# Patient Record
Sex: MALE | Race: WHITE | NOT HISPANIC OR LATINO | URBAN - NONMETROPOLITAN AREA
[De-identification: names, ages, dates, MRNs, and addresses within clinical notes are randomized per-mention and may not be internally consistent; named-entity substitution may affect disease eponyms.]

---

## 2021-03-02 NOTE — PATIENT DISCUSSION
Diabetes without Retinopathy Counseling:  I have discussed with the patient the importance of controlling blood glucose to minimize the risk of developing retinal disease from diabetes. Explained the importance of annual dilated eye exams. Return for follow-up as scheduled. No

## 2021-08-10 ENCOUNTER — IMPORTED ENCOUNTER (OUTPATIENT)
Dept: URBAN - NONMETROPOLITAN AREA CLINIC 1 | Facility: CLINIC | Age: 71
End: 2021-08-10

## 2021-08-10 PROBLEM — H25.813: Noted: 2021-08-10

## 2021-08-10 PROBLEM — H52.4: Noted: 2021-08-10

## 2021-08-10 PROBLEM — E11.9: Noted: 2021-08-10

## 2021-08-10 PROCEDURE — S0620 ROUTINE OPHTHALMOLOGICAL EXA: HCPCS

## 2021-08-10 NOTE — PATIENT DISCUSSION
Presbyopia OUDiscussed refractive status in detail with patient. MR done today but do not recommend updating due to cataract progression. Continue to monitor. NIDDM sans Retinopathy Discussed diagnosis with patient. Discussed the risk of diabetic damage of the retina with potential vision loss and the importance of routine follow-up. Emphasized strict blood sugar control. Continue to monitor. Combined Cataract OUDiscussed diagnosis with patient. Reviewed symptoms related to cataract progression. Discussed various treatment options with patient. Recommend cataract evaluation by Dr. Arabella Blevins. Patient elects to schedule.

## 2021-08-23 ENCOUNTER — IMPORTED ENCOUNTER (OUTPATIENT)
Dept: URBAN - NONMETROPOLITAN AREA CLINIC 1 | Facility: CLINIC | Age: 71
End: 2021-08-23

## 2021-08-23 PROBLEM — H52.4: Noted: 2021-08-23

## 2021-08-23 PROBLEM — E11.9: Noted: 2021-08-23

## 2021-08-23 PROBLEM — H25.813: Noted: 2021-08-23

## 2021-08-23 PROCEDURE — 92014 COMPRE OPH EXAM EST PT 1/>: CPT

## 2021-08-23 NOTE — PATIENT DISCUSSION
Cataract(s)-Visually significant cataract OU .-Cataract(s) causing symptomatic impairment of visual function not correctable with a tolerable change in glasses or contact lenses lighting or non-operative means resulting in specific activity limitations and/or participation restrictions including but not limited to reading viewing television driving or meeting vocational or recreational needs. -Expectation is clearer vision and functional improvement in symptoms as well as reduced glare disability after cataract removal.-Order IOLMaster and OPD today. -Recommend Toric OD/LRI OS vs Stand/Trad OU  based on today's OPD testing and lifestyle questionnaire.-All questions were answered regarding surgery including pre and post-op medications appointments activity restrictions and anesthetic usage.-The risks benefits and alternatives and special risk factors for the patient were discussed in detail including but not limited to: bleeding infection retinal detachment vitreous loss problems with the implant and possible need for additional surgery.-Although rare the possibility of complete vision loss was discussed.-The possible need for glasses post-operatively was discussed.-Order medical clearance exam based on history of diabetes-Patient elects to proceed with cataract surgery OD . Will schedule at patient's convenience and re-evaluate OS  in the future. Discussed lensQualifies for Toric OD and LRI OS explained lens benefits and advised would still need glasses for reading. Discussed Stand/Trad OU and need for bifocals s/p surgery. Discussed LenSx vs TradPost op inflammation anticipated discussed dextenza insertion after surgery.

## 2021-08-27 ENCOUNTER — IMPORTED ENCOUNTER (OUTPATIENT)
Dept: URBAN - NONMETROPOLITAN AREA CLINIC 1 | Facility: CLINIC | Age: 71
End: 2021-08-27

## 2021-08-27 PROBLEM — E11.9: Noted: 2021-08-27

## 2021-08-27 PROBLEM — H52.4: Noted: 2021-08-27

## 2021-08-27 PROBLEM — H25.813: Noted: 2021-08-27

## 2021-09-16 PROBLEM — I10: Noted: 2021-09-16

## 2021-09-16 PROBLEM — Z01.818: Noted: 2021-09-16

## 2021-09-16 PROBLEM — E78.5: Noted: 2021-09-16

## 2021-09-16 PROBLEM — E11.9: Noted: 2021-08-23

## 2021-09-22 ENCOUNTER — IMPORTED ENCOUNTER (OUTPATIENT)
Dept: URBAN - NONMETROPOLITAN AREA CLINIC 1 | Facility: CLINIC | Age: 71
End: 2021-09-22

## 2021-10-08 ENCOUNTER — IMPORTED ENCOUNTER (OUTPATIENT)
Dept: URBAN - NONMETROPOLITAN AREA CLINIC 1 | Facility: CLINIC | Age: 71
End: 2021-10-08

## 2021-10-08 PROBLEM — Z98.41: Noted: 2021-10-08

## 2021-10-08 PROCEDURE — 0356T INSERTION OF DRUG-ELUTING IMPLANT (INCLUDING PUNCTAL DILATION AND IMPLANT REMOVAL WHEN PERFORMED) INTO LACRIMAL CANALICULUS, EACH: CPT

## 2021-10-08 PROCEDURE — V2787 ASTIGMATISM-CORRECT FUNCTION: HCPCS

## 2021-10-08 PROCEDURE — 99024 POSTOP FOLLOW-UP VISIT: CPT

## 2021-10-08 PROCEDURE — 92136 OPHTHALMIC BIOMETRY: CPT

## 2021-10-08 PROCEDURE — 66984 XCAPSL CTRC RMVL W/O ECP: CPT

## 2021-10-08 NOTE — PATIENT DISCUSSION
s/p PCIOL-Pt doing well s/p PCIOL. -Continue post-op gtts according to instruction sheet and sleep with eye shield over eye for 7 nights.-Avoid bending at the waist lifting anything over 5lbs and dirty or josi environments. -RTC .

## 2021-10-13 ENCOUNTER — IMPORTED ENCOUNTER (OUTPATIENT)
Dept: URBAN - NONMETROPOLITAN AREA CLINIC 1 | Facility: CLINIC | Age: 71
End: 2021-10-13

## 2021-10-13 PROBLEM — H25.812: Noted: 2021-10-13

## 2021-10-13 PROBLEM — Z98.41: Noted: 2021-10-13

## 2021-10-13 PROCEDURE — 99024 POSTOP FOLLOW-UP VISIT: CPT

## 2021-10-13 NOTE — PATIENT DISCUSSION
Cataract(s)-Visually significant cataract OS . -Cataract(s) causing symptomatic impairment of visual function not correctable with a tolerable change in glasses or contact lenses lighting or non-operative means resulting in specific activity limitations and/or participation restrictions including but not limited to reading viewing television driving or meeting vocational or recreational needs. -Expectation is clearer vision and functional improvement in symptoms as well as reduced glare disability after cataract removal.-Recommend Toric IOL   /   Limbal Relaxing Incisions based on previous OPD testing and lifestyle questionnaire.-All questions were answered regarding surgery including pre and post-op medications appointments activity restrictions and anesthetic usage.-The risks benefits and alternatives and special risk factors for the patient were discussed in detail including but not limited to: bleeding infection retinal detachment vitreous loss problems with the implant and possible need for additional surgery.-Although rare the possibility of complete vision loss was discussed.-The need for glasses post-operatively was discussed.-Patient elects to proceed with cataract surgery OS . Will schedule at patient's convenience. s/p PCIOL CE OD Toric/LenSx + Dextenza -Pt doing well at 1 week s/p PCIOL. -Continue post-op gtts according to instruction sheet.-Okay to resume usual activites and d/c eye shield.

## 2021-11-05 ENCOUNTER — IMPORTED ENCOUNTER (OUTPATIENT)
Dept: URBAN - NONMETROPOLITAN AREA CLINIC 1 | Facility: CLINIC | Age: 71
End: 2021-11-05

## 2021-11-05 PROBLEM — Z98.41: Noted: 2021-10-13

## 2021-11-05 PROBLEM — Z98.42: Noted: 2021-11-05

## 2021-11-05 PROCEDURE — 66999 UNLISTED PX ANT SEGMENT EYE: CPT

## 2021-11-05 PROCEDURE — 99024 POSTOP FOLLOW-UP VISIT: CPT

## 2021-11-05 PROCEDURE — 66984 XCAPSL CTRC RMVL W/O ECP: CPT

## 2021-11-05 PROCEDURE — 92136 OPHTHALMIC BIOMETRY: CPT

## 2021-11-05 PROCEDURE — 0356T INSERTION OF DRUG-ELUTING IMPLANT (INCLUDING PUNCTAL DILATION AND IMPLANT REMOVAL WHEN PERFORMED) INTO LACRIMAL CANALICULUS, EACH: CPT

## 2021-11-05 NOTE — PATIENT DISCUSSION
Same Day POV CE OS 11/5/21 LRI/LenSx CE OD 10/8/21 Toric/LenSx w Dexenza - Discussed diagnosis in detail with patient. - Patient doing well and stable. - Continue post op drops as directed. - Continue to monitor. RTC in 1 week for POV

## 2021-11-09 ENCOUNTER — IMPORTED ENCOUNTER (OUTPATIENT)
Dept: URBAN - NONMETROPOLITAN AREA CLINIC 1 | Facility: CLINIC | Age: 71
End: 2021-11-09

## 2021-11-09 PROCEDURE — 99024 POSTOP FOLLOW-UP VISIT: CPT

## 2022-04-09 ASSESSMENT — VISUAL ACUITY
OS_CC: 20/40
OS_AM: 20/25
OS_GLARE: 20/50
OS_AM: 20/25
OS_GLARE: 20/50
OS_AM: 20/25
OD_CC: 20/30-
OD_PAM: 20/30
OD_PH: 20/30
OS_CC: 20/25
OD_PH: 20/30-2
OD_GLARE: 20/70
OS_GLARE: 20/50
OD_PH: 20/30-
OS_PH: 20/30-
OS_SC: 20/40
OS_GLARE: 20/50
OD_SC: 20/60
OS_CC: 20/70
OS_CC: 20/50
OD_PAM: 20/30
OD_CC: 20/40-2
OD_CC: 20/100+1
OS_CC: 20/40
OS_SC: 20/30
OS_AM: 20/25
OS_AM: 20/25
OD_GLARE: 20/70
OS_GLARE: 20/50
OD_CC: 20/40-
OD_SC: 20/40

## 2022-04-09 ASSESSMENT — TONOMETRY
OS_IOP_MMHG: 18
OS_IOP_MMHG: 18
OS_IOP_MMHG: 14
OD_IOP_MMHG: 17
OS_IOP_MMHG: 16
OD_IOP_MMHG: 14
OS_IOP_MMHG: 15
OD_IOP_MMHG: 14
OS_IOP_MMHG: 14
OD_IOP_MMHG: 21
OD_IOP_MMHG: 16
OD_IOP_MMHG: 17

## 2022-08-04 NOTE — PATIENT DISCUSSION
4 day POV CE OS 11/5/21 LRI/LenSx CE OD 10/8/21 Toric/LenSx w Dexenza - Discussed diagnosis in detail with patient. - Patient doing well and stable. - Continue post op drops as directed. - Continue to monitor. RTC in 3 months for follow up with [facility]
Yes...

## 2022-08-09 ENCOUNTER — EMERGENCY VISIT (OUTPATIENT)
Dept: RURAL CLINIC 3 | Facility: CLINIC | Age: 72
End: 2022-08-09

## 2022-08-09 DIAGNOSIS — E11.3493: ICD-10-CM

## 2022-08-09 DIAGNOSIS — H43.12: ICD-10-CM

## 2022-08-09 PROCEDURE — 92134 CPTRZ OPH DX IMG PST SGM RTA: CPT

## 2022-08-09 PROCEDURE — 99214 OFFICE O/P EST MOD 30 MIN: CPT

## 2022-08-09 ASSESSMENT — VISUAL ACUITY
OD_PH: 20/90
OS_SC: 20/30

## 2022-08-29 NOTE — PATIENT DISCUSSION
DISCUSSED WITH PATIENT OPTION OF STANDARD VS CUSTOM. PATIENT UNDERSTANDS AND IS FINE WITH GLASSES FULL TIME (PRISM) AND CHOSSES STANDARD.

## 2024-03-01 NOTE — PATIENT DISCUSSION
The cataracts are visually significant. [Abdominal Pain] : abdominal pain [Diarrhea: Grade 0] : Diarrhea: Grade 0 [Negative] : Allergic/Immunologic [Joint Pain] : joint pain [Vomiting] : no vomiting [Constipation] : no constipation [Muscle Pain] : no muscle pain [Joint Stiffness] : no joint stiffness [Muscle Weakness] : no muscle weakness [Easy Bleeding] : no tendency for easy bleeding [Easy Bruising] : no tendency for easy bruising [Swollen Glands] : no swollen glands [FreeTextEntry7] : L sided groin pain  [FreeTextEntry9] : L hip pain even though fall was on the R hip  [de-identified] : lymphedema over the LUE

## 2024-04-27 NOTE — PATIENT DISCUSSION
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 Stressed the importance of strong glucose control. Advised the importance of at least yearly dilated examinations, but to contact us immediately for any problems or concerns. Statement Selected
